# Patient Record
Sex: FEMALE | Race: BLACK OR AFRICAN AMERICAN | Employment: FULL TIME | URBAN - METROPOLITAN AREA
[De-identification: names, ages, dates, MRNs, and addresses within clinical notes are randomized per-mention and may not be internally consistent; named-entity substitution may affect disease eponyms.]

---

## 2020-10-26 ENCOUNTER — OFFICE VISIT (OUTPATIENT)
Dept: SURGERY | Age: 30
End: 2020-10-26

## 2020-10-26 VITALS
HEART RATE: 74 BPM | WEIGHT: 217 LBS | DIASTOLIC BLOOD PRESSURE: 80 MMHG | TEMPERATURE: 98 F | HEIGHT: 64 IN | SYSTOLIC BLOOD PRESSURE: 131 MMHG | BODY MASS INDEX: 37.05 KG/M2

## 2020-10-26 DIAGNOSIS — D24.2 BREAST FIBROADENOMA, LEFT: Primary | ICD-10-CM

## 2020-10-26 PROCEDURE — 76642 ULTRASOUND BREAST LIMITED: CPT | Performed by: SURGERY

## 2020-10-26 PROCEDURE — 99202 OFFICE O/P NEW SF 15 MIN: CPT | Performed by: SURGERY

## 2020-10-26 RX ORDER — AMLODIPINE BESYLATE AND ATORVASTATIN CALCIUM 10; 10 MG/1; MG/1
1 TABLET, FILM COATED ORAL DAILY
COMMUNITY

## 2020-10-26 RX ORDER — METFORMIN HYDROCHLORIDE 1000 MG/1
1000 TABLET ORAL 2 TIMES DAILY WITH MEALS
COMMUNITY

## 2020-10-26 RX ORDER — ERGOCALCIFEROL 1.25 MG/1
50000 CAPSULE ORAL
COMMUNITY

## 2020-10-26 NOTE — PATIENT INSTRUCTIONS

## 2020-10-26 NOTE — PROGRESS NOTES
HISTORY OF [address] Ale Goss is a 35 y.o. female. HPI NEW Patient presents for consultation at the request of Dr. Karen Rutledge for LEFT breast mass. Patient saw Dr. Elieser Munoz in  for the same. No changes since then. Family history-  Maternal great aunt is  in her late 42's from breast cancer. Maternal grandfather had colon cancer. Breast imaging-  2015 - breast US done in office by Dr. Elieser Munoz:  Indication: left breast mass 9:00 under areola  Technique: The area was scanned using a high-frequency linear-array near-field transducer  Findings: 1.5 x 1.3 x 0.8 cm mass, oval, hypoechoic, through transmission  Impression: Probable fibroadenoma  Disposition: Discussed observation, biopsy or excision. Pt has elected for observation. Birads 3     Review of Systems   Constitutional: Negative. HENT: Negative. Eyes: Negative. Respiratory: Negative. Cardiovascular: Negative. Gastrointestinal: Negative. Genitourinary: Negative. Musculoskeletal: Negative. Skin: Negative. Neurological: Negative. Endo/Heme/Allergies: Negative. Psychiatric/Behavioral: Negative. All other systems reviewed and are negative. Physical Exam  Vitals signs and nursing note reviewed. Constitutional:       Appearance: She is well-developed. HENT:      Head: Normocephalic. Neck:      Thyroid: No thyromegaly. Pulmonary:      Effort: Pulmonary effort is normal.   Chest:      Breasts: Breasts are symmetrical.         Right: No inverted nipple, mass, nipple discharge, skin change or tenderness. Left: Mass (left breast mass 9:00 behind nipple 1 cm) present. No inverted nipple, nipple discharge, skin change or tenderness. Abdominal:      Palpations: Abdomen is soft. Musculoskeletal: Normal range of motion. Lymphadenopathy:      Cervical: No cervical adenopathy. Skin:     General: Skin is warm and dry.    Neurological:      Mental Status: She is alert and oriented to person, place, and time. BREAST ULTRASOUND  Indication: left breast mass 9:00 behind nipple   Technique: The area was scanned using a high-frequency linear-array near-field transducer  Findings: 1.0 x 0.7 x 0.7 cm mass, oval, hypoechoic, through transmission, calcified  Impression: Probable fibroadenoma, smaller since 2015   Disposition: Discussed observation, biopsy or excision. Pt has elected for observation. ASSESSMENT and PLAN    ICD-10-CM ICD-9-CM    1. Breast fibroadenoma, left  D24.2 1      36 yo female with left breast fibroadenoma, smaller than 5 years ago  Will observe. Continue monthly sbe. F/u as needed.   Start screening mammograms age 37

## 2020-10-26 NOTE — LETTER
10/26/20 Patient: Franco Arcos YOB: 1990 Date of Visit: 10/26/2020 Wing Combs, [address] [facility] [address] [facility] VIA Facsimile: [phone] Ravin Peraza MD 
[address] [facility] [address] [facility] [address] [address] VIA Facsimile: [phone] Dear DO Ravin Hidalgo MD, Thank you for referring Ms. [address] [facility] to [address] [facility] [place] MAIN OFFICE [address] [facility] for evaluation. My notes for this consultation are attached. If you have questions, please do not hesitate to call me. I look forward to following your patient along with you. Sincerely, Deepa Taveras MD

## 2023-12-13 ENCOUNTER — HOSPITAL ENCOUNTER (EMERGENCY)
Facility: HOSPITAL | Age: 33
Discharge: HOME | End: 2023-12-13
Attending: EMERGENCY MEDICINE
Payer: COMMERCIAL

## 2023-12-13 ENCOUNTER — APPOINTMENT (OUTPATIENT)
Facility: HOSPITAL | Age: 33
End: 2023-12-13
Attending: EMERGENCY MEDICINE
Payer: COMMERCIAL

## 2023-12-13 VITALS
OXYGEN SATURATION: 99 % | WEIGHT: 278.88 LBS | DIASTOLIC BLOOD PRESSURE: 110 MMHG | RESPIRATION RATE: 14 BRPM | HEART RATE: 108 BPM | TEMPERATURE: 98.6 F | SYSTOLIC BLOOD PRESSURE: 176 MMHG

## 2023-12-13 DIAGNOSIS — J40 BRONCHITIS: Primary | ICD-10-CM

## 2023-12-13 LAB
ALBUMIN SERPL-MCNC: 3.1 G/DL (ref 3.5–5)
ALBUMIN/GLOB SERPL: 0.6 (ref 1.1–2.2)
ALP SERPL-CCNC: 79 U/L (ref 45–117)
ALT SERPL-CCNC: 27 U/L (ref 12–78)
ANION GAP SERPL CALC-SCNC: 11 MMOL/L (ref 5–15)
AST SERPL-CCNC: 22 U/L (ref 15–37)
BASOPHILS # BLD: 0.1 K/UL (ref 0–0.1)
BASOPHILS NFR BLD: 0 % (ref 0–1)
BILIRUB SERPL-MCNC: 0.3 MG/DL (ref 0.2–1)
BUN SERPL-MCNC: 12 MG/DL (ref 6–20)
BUN/CREAT SERPL: 13 (ref 12–20)
CALCIUM SERPL-MCNC: 9.7 MG/DL (ref 8.5–10.1)
CHLORIDE SERPL-SCNC: 100 MMOL/L (ref 97–108)
CO2 SERPL-SCNC: 26 MMOL/L (ref 21–32)
CREAT SERPL-MCNC: 0.94 MG/DL (ref 0.55–1.02)
DIFFERENTIAL METHOD BLD: ABNORMAL
EKG ATRIAL RATE: 107 BPM
EKG DIAGNOSIS: NORMAL
EKG P AXIS: 56 DEGREES
EKG P-R INTERVAL: 114 MS
EKG Q-T INTERVAL: 334 MS
EKG QRS DURATION: 76 MS
EKG QTC CALCULATION (BAZETT): 445 MS
EKG R AXIS: 16 DEGREES
EKG T AXIS: 145 DEGREES
EKG VENTRICULAR RATE: 107 BPM
EOSINOPHIL # BLD: 0.1 K/UL (ref 0–0.4)
EOSINOPHIL NFR BLD: 1 % (ref 0–7)
ERYTHROCYTE [DISTWIDTH] IN BLOOD BY AUTOMATED COUNT: 13.8 % (ref 11.5–14.5)
GLOBULIN SER CALC-MCNC: 4.9 G/DL (ref 2–4)
GLUCOSE SERPL-MCNC: 242 MG/DL (ref 65–100)
HCT VFR BLD AUTO: 40 % (ref 35–47)
HGB BLD-MCNC: 12.3 G/DL (ref 11.5–16)
IMM GRANULOCYTES # BLD AUTO: 0.1 K/UL (ref 0–0.04)
IMM GRANULOCYTES NFR BLD AUTO: 1 % (ref 0–0.5)
LYMPHOCYTES # BLD: 3.5 K/UL (ref 0.8–3.5)
LYMPHOCYTES NFR BLD: 27 % (ref 12–49)
MCH RBC QN AUTO: 26.2 PG (ref 26–34)
MCHC RBC AUTO-ENTMCNC: 30.8 G/DL (ref 30–36.5)
MCV RBC AUTO: 85.1 FL (ref 80–99)
MONOCYTES # BLD: 0.8 K/UL (ref 0–1)
MONOCYTES NFR BLD: 6 % (ref 5–13)
NEUTS SEG # BLD: 8.5 K/UL (ref 1.8–8)
NEUTS SEG NFR BLD: 65 % (ref 32–75)
NRBC # BLD: 0 K/UL (ref 0–0.01)
NRBC BLD-RTO: 0 PER 100 WBC
PLATELET # BLD AUTO: 244 K/UL (ref 150–400)
PMV BLD AUTO: 12.8 FL (ref 8.9–12.9)
POTASSIUM SERPL-SCNC: 4 MMOL/L (ref 3.5–5.1)
PROT SERPL-MCNC: 8 G/DL (ref 6.4–8.2)
RBC # BLD AUTO: 4.7 M/UL (ref 3.8–5.2)
SODIUM SERPL-SCNC: 137 MMOL/L (ref 136–145)
WBC # BLD AUTO: 13.1 K/UL (ref 3.6–11)

## 2023-12-13 PROCEDURE — 99285 EMERGENCY DEPT VISIT HI MDM: CPT

## 2023-12-13 PROCEDURE — 80053 COMPREHEN METABOLIC PANEL: CPT

## 2023-12-13 PROCEDURE — 85025 COMPLETE CBC W/AUTO DIFF WBC: CPT

## 2023-12-13 PROCEDURE — 36415 COLL VENOUS BLD VENIPUNCTURE: CPT

## 2023-12-13 PROCEDURE — 6370000000 HC RX 637 (ALT 250 FOR IP): Performed by: EMERGENCY MEDICINE

## 2023-12-13 PROCEDURE — 93005 ELECTROCARDIOGRAM TRACING: CPT | Performed by: EMERGENCY MEDICINE

## 2023-12-13 PROCEDURE — 71046 X-RAY EXAM CHEST 2 VIEWS: CPT

## 2023-12-13 RX ORDER — PREDNISONE 10 MG/1
1 TABLET ORAL SEE ADMIN INSTRUCTIONS
Qty: 21 EACH | Refills: 0 | Status: SHIPPED | OUTPATIENT
Start: 2023-12-14

## 2023-12-13 RX ORDER — NEBULIZER ACCESSORIES
1 KIT MISCELLANEOUS DAILY PRN
Qty: 1 KIT | Refills: 0 | Status: SHIPPED | OUTPATIENT
Start: 2023-12-13

## 2023-12-13 RX ORDER — IPRATROPIUM BROMIDE AND ALBUTEROL SULFATE 2.5; .5 MG/3ML; MG/3ML
1 SOLUTION RESPIRATORY (INHALATION)
Status: COMPLETED | OUTPATIENT
Start: 2023-12-13 | End: 2023-12-13

## 2023-12-13 RX ORDER — ALBUTEROL SULFATE 2.5 MG/3ML
2.5 SOLUTION RESPIRATORY (INHALATION) EVERY 6 HOURS PRN
Qty: 50 EACH | Refills: 3 | Status: SHIPPED | OUTPATIENT
Start: 2023-12-13

## 2023-12-13 RX ORDER — AMLODIPINE BESYLATE 10 MG/1
10 TABLET ORAL DAILY
COMMUNITY

## 2023-12-13 RX ORDER — BENZONATATE 100 MG/1
100 CAPSULE ORAL 3 TIMES DAILY PRN
Qty: 30 CAPSULE | Refills: 0 | Status: SHIPPED | OUTPATIENT
Start: 2023-12-13 | End: 2023-12-23

## 2023-12-13 RX ORDER — BENZONATATE 100 MG/1
100 CAPSULE ORAL
Status: COMPLETED | OUTPATIENT
Start: 2023-12-13 | End: 2023-12-13

## 2023-12-13 RX ORDER — PREDNISONE 20 MG/1
40 TABLET ORAL
Status: COMPLETED | OUTPATIENT
Start: 2023-12-13 | End: 2023-12-13

## 2023-12-13 RX ADMIN — BENZONATATE 100 MG: 100 CAPSULE ORAL at 17:33

## 2023-12-13 RX ADMIN — IPRATROPIUM BROMIDE AND ALBUTEROL SULFATE 1 DOSE: 2.5; .5 SOLUTION RESPIRATORY (INHALATION) at 17:34

## 2023-12-13 RX ADMIN — PREDNISONE 40 MG: 20 TABLET ORAL at 17:32

## 2023-12-13 ASSESSMENT — PAIN - FUNCTIONAL ASSESSMENT: PAIN_FUNCTIONAL_ASSESSMENT: NONE - DENIES PAIN

## 2023-12-13 NOTE — ED PROVIDER NOTES
[facility]  EMERGENCY DEPARTMENT ENCOUNTER      Pt Name: Michael Adame  MRN: [id number]  [address] [place] 1990  Date of evaluation: 2023  Provider: Toni Vernon DO      HISTORY OF PRESENT ILLNESS      HPI    81-year-old female with history of HTN, but no other significant past medical history presents to the emergency department stating that about 3 weeks ago she had a cough and sore throat and nasal congestion and thought that she got what her daughter was sick with. She states that she seemed like she was getting better but then today she started to feel worse with increasing shortness of breath and wheezing. She denies any history of prior asthma. She has tested herself multiple times for COVID and flu at home but they were negative. She denies any associated fever. Nursing Notes were reviewed. REVIEW OF SYSTEMS         Review of Systems        PAST MEDICAL HISTORY     Past Medical History:   Diagnosis Date    Hypertension          SURGICAL HISTORY       Past Surgical History:   Procedure Laterality Date    GYN               CURRENT MEDICATIONS       Discharge Medication List as of 2023  6:51 PM        CONTINUE these medications which have NOT CHANGED    Details   amLODIPine (NORVASC) 10 MG tablet Take 1 tablet by mouth dailyHistorical Med             ALLERGIES     Patient has no known allergies. FAMILY HISTORY       Family History   Problem Relation Age of Onset    Hypertension Mother     Breast Cancer Other         maternal great aunt,  from breast cancer, late 42's          SOCIAL HISTORY       Social History     Socioeconomic History    Marital status: Legally      Spouse name: None    Number of children: None    Years of education: None    Highest education level: None   Tobacco Use    Smoking status: Never    Smokeless tobacco: Never   Substance and Sexual Activity    Alcohol use:  Yes     Alcohol/week: 1.7 standard drinks of alcohol

## 2023-12-13 NOTE — ED TRIAGE NOTES
29year old female pt comes to the ED via POV for a CC Shortness of breath. Pt states that 3 weeks ago that she had a cough and sore throat but today she started to feel wheezing and short of breath. No history of asthma. Pt states she tested her self multiple times for covid and flu but they were all negative. Pt is A&Ox4.

## 2023-12-14 NOTE — ED NOTES
Pt d/c vital signs, BP elevated at 176/110, pt states she did not take meds this morning, but will take when gets home. MD informed and ok with pt going home to take medications. Pt given d/c instructions and several Rx. Pt verbalized understanding. Declined w/c and left ambulatory in care of daughter.       Rahat Rogers RN  12/13/23 2220 4